# Patient Record
Sex: MALE | Race: WHITE | NOT HISPANIC OR LATINO | URBAN - METROPOLITAN AREA
[De-identification: names, ages, dates, MRNs, and addresses within clinical notes are randomized per-mention and may not be internally consistent; named-entity substitution may affect disease eponyms.]

---

## 2020-07-19 ENCOUNTER — EMERGENCY (EMERGENCY)
Facility: HOSPITAL | Age: 50
LOS: 1 days | End: 2020-07-19
Admitting: EMERGENCY MEDICINE
Payer: COMMERCIAL

## 2020-07-19 PROCEDURE — 99284 EMERGENCY DEPT VISIT MOD MDM: CPT

## 2020-08-27 PROBLEM — Z00.00 ENCOUNTER FOR PREVENTIVE HEALTH EXAMINATION: Status: ACTIVE | Noted: 2020-08-27

## 2020-08-28 ENCOUNTER — APPOINTMENT (OUTPATIENT)
Dept: NEUROSURGERY | Facility: CLINIC | Age: 50
End: 2020-08-28
Payer: COMMERCIAL

## 2020-08-28 VITALS
HEIGHT: 71 IN | SYSTOLIC BLOOD PRESSURE: 137 MMHG | DIASTOLIC BLOOD PRESSURE: 82 MMHG | WEIGHT: 195 LBS | HEART RATE: 71 BPM | BODY MASS INDEX: 27.3 KG/M2

## 2020-08-28 DIAGNOSIS — M43.06 SPONDYLOLYSIS, LUMBAR REGION: ICD-10-CM

## 2020-08-28 DIAGNOSIS — M51.27 OTHER INTERVERTEBRAL DISC DISPLACEMENT, LUMBOSACRAL REGION: ICD-10-CM

## 2020-08-28 PROCEDURE — 99204 OFFICE O/P NEW MOD 45 MIN: CPT

## 2020-08-28 RX ORDER — CELECOXIB 200 MG/1
200 CAPSULE ORAL TWICE DAILY
Qty: 60 | Refills: 1 | Status: ACTIVE | COMMUNITY
Start: 2020-08-28 | End: 1900-01-01

## 2020-08-28 RX ORDER — METHYLPREDNISOLONE 4 MG/1
4 TABLET ORAL
Qty: 1 | Refills: 0 | Status: ACTIVE | COMMUNITY
Start: 2020-08-28 | End: 1900-01-01

## 2020-08-28 NOTE — HISTORY OF PRESENT ILLNESS
[de-identified] : Andrea is a pleasant 49-year-old gentleman here for evaluation of his lumbar spine. Has a history of back problems for many years however does manage to leave her active lifestyle including golf. Most recently though his had severe back and leg pain on the right which has been significantly worsened in the past. States a remote history of lifting a very large concrete object and feeling excruciating pain in the back. This was all spontaneously over time and since then he's been able to cope with his back problems nonoperatively with very simple means. Most recently however he has been doing massage and taking some simple anti-inflammatories along with other local treatments but the right leg pain has been getting worse and worse to the point where he is almost unable to play golf even. He had an MRI done Paula for my review. He has no left-sided symptoms he has no neurogenic claudication he has some axial back pain and mostly of right radicular pain reminiscent of S1\par \par \par heavy lifting many years ago\par Golf     back issues and chiro Rx\par \par \par R  S1 radiculopathy \par \par

## 2020-08-28 NOTE — REASON FOR VISIT
[New Patient Visit] : a new patient visit [Referred By: _________] : Patient was referred by RENEE [FreeTextEntry1] : Stenosis- Disc Herniation. [facility]

## 2020-08-28 NOTE — RESULTS/DATA
[FreeTextEntry1] : MRI lumbar spine shows a bilateral spondylolysis of L5 with good alignment. There is a right lateral disc herniation on the right at L5-S1. X-rays in the office show no overt instability but do confirm the pars defect

## 2020-08-28 NOTE — PLAN
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 LUMBAR DECOMPRESSION WITH INSTRUMENTED STABILIZATION AND FUSION   L5S1   \par \par This is a patient with degenerated lumbar disks with associated stenosis and spondylolysis L5.  Visit lateral herniated disc on the right at L5S1 causing radiculopathy  I have recommended nonsurgical management at this time.  This consists of physical therapy and/or manual medicine in conjunction to medical therapy and other conservative methods.  These include the consideration of trigger point injections and or the utilization of modalities such as TENS where applicable.  The next tier would be the referral to a pain management specialist (anesthesia or physiatry) for the consideration of spinal injections.  This includes the options of epidural steroids, facet injections as well as other novel techniques that may provide pain relief.  \par \par If all nonsurgical methods fail or there is neurological issue of concern, I have recommended lumbar decompression as a treatment option.  This entails removing the lamina and the medial facet joints along with the underlying hypertrophied ligamentum flavum.  This will allow for a widening of the spinal canal and the neuroforamen at the effected levels.  In doing so will likely result in added instability to the spine at this level requiring the need for instrumented stabilization and fusion.  This implies the use of pedicle screws and possibly interbody prosthetics to provide strength and anatomical alignment to the operated segments.  \par \par Risks and benefits of surgery were described to the patient in detail which include but not excluding those otherwise not mentioned:  coma paralysis, death, stroke, spinal fluid leak, nerve injury, weakness, infection, hardware malfunction/failure/mal-positioning requiring re-operation, vascular injury, nonunion/pseudoarthrosis, adjacent segment degeneration, dysphonia/dysphagia and persistent pain.\par \par I have reviewed the images in detail with the patient today in my office and have answered all questions regarding this condition to the best of my ability to the patient’s satisfaction.

## 2021-10-14 ENCOUNTER — APPOINTMENT (OUTPATIENT)
Dept: RADIOLOGY | Facility: CLINIC | Age: 51
End: 2021-10-14
Payer: COMMERCIAL

## 2021-10-14 PROCEDURE — 71046 X-RAY EXAM CHEST 2 VIEWS: CPT
